# Patient Record
Sex: MALE | Race: OTHER | NOT HISPANIC OR LATINO
[De-identification: names, ages, dates, MRNs, and addresses within clinical notes are randomized per-mention and may not be internally consistent; named-entity substitution may affect disease eponyms.]

---

## 2021-04-05 PROBLEM — Z00.00 ENCOUNTER FOR PREVENTIVE HEALTH EXAMINATION: Status: ACTIVE | Noted: 2021-04-05

## 2021-04-12 ENCOUNTER — APPOINTMENT (OUTPATIENT)
Dept: VASCULAR SURGERY | Facility: CLINIC | Age: 66
End: 2021-04-12
Payer: COMMERCIAL

## 2021-04-12 DIAGNOSIS — Z78.9 OTHER SPECIFIED HEALTH STATUS: ICD-10-CM

## 2021-04-12 DIAGNOSIS — Z86.79 PERSONAL HISTORY OF OTHER DISEASES OF THE CIRCULATORY SYSTEM: ICD-10-CM

## 2021-04-12 PROCEDURE — 99072 ADDL SUPL MATRL&STAF TM PHE: CPT

## 2021-04-12 PROCEDURE — 99243 OFF/OP CNSLTJ NEW/EST LOW 30: CPT

## 2021-04-12 NOTE — ASSESSMENT
[FreeTextEntry1] : 66 yo male with no medical problems with pain in his left calf with ambulation. He has no risk factors for PAD. He has normal PT pulses bilaterally. I am uncertain of the etiology of the pain. I recommended that he undergo exercising ABIs to definitively rule out PAD as the cause of his pain. I recommended that he continue to walk for 1 hour per day. He will follow up with me when the results of the study are available.

## 2021-04-12 NOTE — HISTORY OF PRESENT ILLNESS
[FreeTextEntry1] : 64 yo male reports referred by Dr. Segura for left calf pain with ambulation. He reports that he walks daily with his wife. He reports that he walks for approximately 45 minutes to an hour regularly. He reports mild pain in his left calf with ambulation. He denies a history of smoking. He does not have diabetes. He reports that he occasionally wakes up with pins and needles in his left foot.

## 2021-04-12 NOTE — PHYSICAL EXAM
[JVD] : no jugular venous distention  [Carotid Bruits] : no carotid bruits [Normal Breath Sounds] : Normal breath sounds [Normal Rate and Rhythm] : normal rate and rhythm [2+] : left 2+ [0] : right 0 [Ankle Swelling (On Exam)] : not present [Ankle Swelling Bilaterally] : bilaterally  [Abdomen Masses] : No abdominal masses [No Rash or Lesion] : No rash or lesion [Alert] : alert [Oriented to Person] : oriented to person [Oriented to Place] : oriented to place [Oriented to Time] : oriented to time [de-identified] : Awake and Alert [de-identified] : bilateral feet warm and pink no ulceration or skin breakdown [de-identified] : No gross motor or sensory deficits [de-identified] : Appropriate

## 2021-04-12 NOTE — CONSULT LETTER
[Dear  ___] : Dear  [unfilled], [Consult Letter:] : I had the pleasure of evaluating your patient, [unfilled]. [Please see my note below.] : Please see my note below. [Consult Closing:] : Thank you very much for allowing me to participate in the care of this patient.  If you have any questions, please do not hesitate to contact me. [Sincerely,] : Sincerely, [FreeTextEntry2] : Bryant Segura [FreeTextEntry3] : Nick Bonilla MD, RPVI\par Chief, Vascular Surgery\par

## 2021-04-13 ENCOUNTER — APPOINTMENT (OUTPATIENT)
Dept: VASCULAR SURGERY | Facility: CLINIC | Age: 66
End: 2021-04-13
Payer: COMMERCIAL

## 2021-04-13 DIAGNOSIS — I70.212 ATHEROSCLEROSIS OF NATIVE ARTERIES OF EXTREMITIES WITH INTERMITTENT CLAUDICATION, LEFT LEG: ICD-10-CM

## 2021-04-13 PROCEDURE — 99072 ADDL SUPL MATRL&STAF TM PHE: CPT

## 2021-04-13 PROCEDURE — 93924 LWR XTR VASC STDY BILAT: CPT

## 2021-04-15 ENCOUNTER — TRANSCRIPTION ENCOUNTER (OUTPATIENT)
Age: 66
End: 2021-04-15

## 2021-04-19 ENCOUNTER — APPOINTMENT (OUTPATIENT)
Dept: VASCULAR SURGERY | Facility: CLINIC | Age: 66
End: 2021-04-19
Payer: COMMERCIAL

## 2021-04-19 PROCEDURE — 99072 ADDL SUPL MATRL&STAF TM PHE: CPT

## 2021-04-19 PROCEDURE — 99213 OFFICE O/P EST LOW 20 MIN: CPT

## 2021-04-19 NOTE — REVIEW OF SYSTEMS
[Fever] : no fever [Chills] : no chills [Leg Claudication] : intermittent leg claudication [Lower Ext Edema] : no extremity edema [Limb Pain] : no limb pain [Limb Swelling] : no limb swelling [Limb Weakness] : no limb weakness [Difficulty Walking] : no difficulty walking

## 2021-04-19 NOTE — ASSESSMENT
[FreeTextEntry1] : 66 yo male with no medical problems with pain in his left calf with ambulation. He has no risk factors for PAD. He has normal PT pulses bilaterally. He underwent exercising ABIs which demonstrated a normal COURTNEY at rest and post exercise. I believe this effectively rules out PAD as the source of his pain. I am suspicious that the pain is musculoskeletal in etiology. I recommended that he make an appointment with an orthopedist for further evaluation.\par \par Follow up on a PRN basis.

## 2021-04-19 NOTE — HISTORY OF PRESENT ILLNESS
[FreeTextEntry1] : 66 yo male reports referred by Dr. Segura for left calf pain with ambulation. He reports that he walks daily with his wife. He reports that he walks for approximately 45 minutes to an hour regularly. He reports mild pain in his left calf with ambulation. He denies a history of smoking. He does not have diabetes. He reports that he occasionally wakes up with pins and needles in his left foot.  [de-identified] : 66 yo male returns in follow up for a chief complaint of pain in his left calf with ambulation. He had normal PT pulses on physical exam and a normal COURTNEY at rest. He underwent exercise arterial testing and is here to discuss the results. He reports continued tenderness in his left calf especially when walking up hills or in the heat. He continues to walk daily with his wife.

## 2021-04-19 NOTE — PHYSICAL EXAM
[JVD] : no jugular venous distention  [Carotid Bruits] : no carotid bruits [Normal Breath Sounds] : Normal breath sounds [Normal Rate and Rhythm] : normal rate and rhythm [2+] : left 2+ [0] : left 0 [Ankle Swelling (On Exam)] : not present [Ankle Swelling Bilaterally] : bilaterally  [Abdomen Masses] : No abdominal masses [No Rash or Lesion] : No rash or lesion [Alert] : alert [Oriented to Person] : oriented to person [Oriented to Place] : oriented to place [Oriented to Time] : oriented to time [de-identified] : Awake and Alert [de-identified] : bilateral feet warm and pink no ulceration or skin breakdown [de-identified] : No gross motor or sensory deficits [de-identified] : Appropriate

## 2021-04-24 PROBLEM — I70.212 ATHEROSCLEROSIS OF NATIVE ARTERY OF LEFT LOWER EXTREMITY WITH INTERMITTENT CLAUDICATION: Status: ACTIVE | Noted: 2021-04-12

## 2021-05-27 ENCOUNTER — RESULT REVIEW (OUTPATIENT)
Age: 66
End: 2021-05-27

## 2021-06-18 ENCOUNTER — NON-APPOINTMENT (OUTPATIENT)
Age: 66
End: 2021-06-18

## 2021-06-18 ENCOUNTER — APPOINTMENT (OUTPATIENT)
Dept: HEART AND VASCULAR | Facility: CLINIC | Age: 66
End: 2021-06-18
Payer: COMMERCIAL

## 2021-06-18 VITALS
SYSTOLIC BLOOD PRESSURE: 130 MMHG | RESPIRATION RATE: 16 BRPM | BODY MASS INDEX: 25.18 KG/M2 | DIASTOLIC BLOOD PRESSURE: 76 MMHG | HEIGHT: 73 IN | TEMPERATURE: 97.8 F | HEART RATE: 65 BPM | OXYGEN SATURATION: 98 % | WEIGHT: 190 LBS

## 2021-06-18 DIAGNOSIS — R93.1 ABNORMAL FINDINGS ON DIAGNOSTIC IMAGING OF HEART AND CORONARY CIRCULATION: ICD-10-CM

## 2021-06-18 PROCEDURE — 99072 ADDL SUPL MATRL&STAF TM PHE: CPT

## 2021-06-18 PROCEDURE — 99204 OFFICE O/P NEW MOD 45 MIN: CPT

## 2021-06-25 PROBLEM — R93.1 ELEVATED CORONARY ARTERY CALCIUM SCORE: Status: ACTIVE | Noted: 2021-06-25

## 2021-07-19 ENCOUNTER — RESULT REVIEW (OUTPATIENT)
Age: 66
End: 2021-07-19

## 2021-07-27 ENCOUNTER — RESULT REVIEW (OUTPATIENT)
Age: 66
End: 2021-07-27

## 2021-08-20 ENCOUNTER — APPOINTMENT (OUTPATIENT)
Dept: HEART AND VASCULAR | Facility: CLINIC | Age: 66
End: 2021-08-20
Payer: COMMERCIAL

## 2021-08-20 PROCEDURE — 99213 OFFICE O/P EST LOW 20 MIN: CPT | Mod: 95

## 2021-08-21 RX ORDER — ASPIRIN 81 MG/1
81 TABLET, CHEWABLE ORAL
Refills: 0 | Status: ACTIVE | COMMUNITY
Start: 2021-08-21

## 2021-08-21 RX ORDER — METOPROLOL TARTRATE 25 MG/1
25 TABLET, FILM COATED ORAL DAILY
Qty: 2 | Refills: 0 | Status: DISCONTINUED | COMMUNITY
Start: 2021-07-13 | End: 2021-08-21

## 2021-08-21 RX ORDER — LOSARTAN POTASSIUM 50 MG/1
50 TABLET, FILM COATED ORAL
Qty: 30 | Refills: 0 | Status: ACTIVE | COMMUNITY
Start: 2021-08-13

## 2021-08-21 RX ORDER — CLOPIDOGREL BISULFATE 75 MG/1
75 TABLET, FILM COATED ORAL
Qty: 30 | Refills: 0 | Status: ACTIVE | COMMUNITY
Start: 2021-08-13

## 2021-08-21 RX ORDER — ATORVASTATIN CALCIUM 40 MG/1
40 TABLET, FILM COATED ORAL
Qty: 30 | Refills: 0 | Status: ACTIVE | COMMUNITY
Start: 2021-08-13

## 2021-08-24 NOTE — DISCUSSION/SUMMARY
[FreeTextEntry1] : 64yo M with recent diagnosis of CAD s/p PCI to LAD.\par \par Patient's dietary, exercise and overall lifestyle habits were reviewed. The concept of atherosclerosis and its systemic nature was discussed with a focus on the need to get all cardiovascular risk factors to goal. At this time, I would like to make the following recommendations to optimize atherosclerotic risk factors. \par \par Secondary prevention recommendations:\par Anti-platelet Therapy: DAPT per interventionalist recommendation. \par Lipid Therapy: High dose statin therapy would likely benefit this patient. LDL goal <70. Would check lipid profile in 3 months an consider adding other agents (such as Zetia) if remains above goal\par Hypertension: Patient recently started on losartan.  \par Mediterranean Diet Score is 5. Patient is already planned to see nutrition to discuss dietary supplements. Some suggestions include continue incorporating 2 or more servings per day of vegetables, fruits, and whole grains. Increase intake of fish and legumes/beans to 2 or more servings per week. Aim to increase intake of healthy fats, such as olive oil and avocados, and have a handful of nuts/seeds most days. Reduce red/processed meat consumption to 2 or fewer times per week. Supplements such as fish oil unlikely to help given unknown composition of supplements and triglycerides already in normal range. \par Exercise: Recommended gradually increasing activity to 30-45 minutes most days of the week once cleared by Dr. cMkeon. Cardiac rehab might benefit this patient and is covered by major insurance plans (other than co-pays), please refer. \par Obesity/Overweight: Patient's last BMI borderline (25)--pt has lost weight since then so likely in normal range now. Continue with portion control but incorporate healthy foods as outlined above. \par Glucometabolic State: HbA1c 5.8%. Discussed prediabetes and importance of dietary habits. \par \par Thank you for the opportunity to see this patient. Please feel free to contact [facility] if there are any questions, or if you feel that your patient would benefit from continued follow-up visits with the Program.

## 2021-08-24 NOTE — REASON FOR VISIT
[CV Risk Factors and Non-Cardiac Disease] : CV risk factors and non-cardiac disease [Spouse] : spouse [Home] : at home, [unfilled] , at the time of the visit. [Medical Office: ([facility])___] : at the medical office located in  [Verbal consent obtained from patient] : the patient, [unfilled] [FreeTextEntry1] : \par

## 2021-08-25 ENCOUNTER — APPOINTMENT (OUTPATIENT)
Dept: HEART AND VASCULAR | Facility: CLINIC | Age: 66
End: 2021-08-25
Payer: COMMERCIAL

## 2021-08-25 PROCEDURE — 99244 OFF/OP CNSLTJ NEW/EST MOD 40: CPT | Mod: GT

## 2021-08-31 NOTE — HISTORY OF PRESENT ILLNESS
COLONOSCOPY REPORT.    GASTROENTEROLOGIST: Raj Thornton D.O    ASSISTANT: None    2021    Simon Tierney  : 1950  MRN: [id number]    PRE-OPERATIVE DIAGNOSES / INDICATIONS:  · Screening for colon cancer    COLONOSCOPY DONE 2021, REVEALED:  · Normal colon    OPERATION PERFORMED:  · Colonoscopy up to the terminal ileum with the help of anesthesia service.      EBL:  None.    ANESTHESIA:  MAC    SPECIMENS:   None    IMPLANTS:  None    COMPLICATIONS: None    DESCRIPTION OF OPERATION:  The risks and benefits of the procedure were explained to the patient who demonstrated an understanding and consented to the procedure.  Informed consent discussion included discussion of risks of sedation and procedure. The history was reviewed and the patient was examined. The patient was stable for the procedure and monitored throughout.     Digital rectal exam revealed normal anal tone.  No mass lesions were felt on rectal exam.    The endoscope was then introduced into the rectum and advanced carefully to the cecum.   Appendiceal orifice was visualized.     · The prep was adequate.  · Terminal ileum was intubated. Mucosa in distal 10-15 cm of terminal ileum was normal.  · Mucosa in the cecum, ascending, transverse, descending, sigmoid colon and rectum was normal.    At the anal verge on retroflexion minimal internal hemorrhoids were noticed.  No mass lesions or fissures were noticed in the anal canal.    Following the procedure, the colon was decompressed, the endoscope removed and the procedure was terminated. Immediate postoperative condition of patient was normal and the patient was transferred to the recovery area.     RECOMMENDATIONS:  · Follow up with primary care as scheduled/as needed.  · Call my office with questions or concerns at [phone].   · Patient to increase oral fluid intake while taking fiber.  · Repeat colonoscopy in 10 years.      [FreeTextEntry1] : 64yo M with:\par CAD: recent diagnosis, s/p LAD PCI following abnl calcium score and finding of puente and mild sscp\par started on aspirin and clopidogrel\par s/p PCI last Friday\par \par Seen for preventive assessment. \par After visit with cardiology, lost weight intentionally with significant dietary changes. \par Feeling well post procedure\par Has an appointment with nutritionist\par Tolerating new med regimen \par Started on losartan as well for HTN\par \par Recent labs:\par Cr 1.26\par Chol 223\par Trig 61\par HDL 65\par \par \par HgbA1c a month ago 5.8\par \par Current Meds:\par asa 81\par atorvastatin 40mg\par losartan 50mg\par clopidogrel 75mg\par supplements: vitamins, fish oil\par \par Lifestyle History:\par Mediterranean Diet Score (9 question survey) was 5.\par (8-9: optimal, 6-7: near-optimal, 4-5: suboptimal, 0-3: markedly suboptimal)\par As above, made significant diet changes in last month--stopped eating pizza, pasta, bread\par Exercise:Walks for 40-50 minutes.\par Smoking: Never smoker\par \par FH:\par Mother: started having heart disease in her 60s\par kids 36 and 33 - both healthy

## 2022-07-08 ENCOUNTER — NON-APPOINTMENT (OUTPATIENT)
Age: 67
End: 2022-07-08

## 2022-07-08 ENCOUNTER — APPOINTMENT (OUTPATIENT)
Dept: HEART AND VASCULAR | Facility: CLINIC | Age: 67
End: 2022-07-08

## 2022-07-08 VITALS
SYSTOLIC BLOOD PRESSURE: 118 MMHG | HEIGHT: 73 IN | DIASTOLIC BLOOD PRESSURE: 78 MMHG | TEMPERATURE: 98 F | OXYGEN SATURATION: 97 % | BODY MASS INDEX: 22.4 KG/M2 | HEART RATE: 59 BPM | WEIGHT: 169 LBS

## 2022-07-08 DIAGNOSIS — I25.10 ATHEROSCLEROTIC HEART DISEASE OF NATIVE CORONARY ARTERY W/OUT ANGINA PECTORIS: ICD-10-CM

## 2022-07-08 DIAGNOSIS — Z95.5 PRESENCE OF CORONARY ANGIOPLASTY IMPLANT AND GRAFT: ICD-10-CM

## 2022-07-08 PROCEDURE — 99214 OFFICE O/P EST MOD 30 MIN: CPT

## 2022-09-06 ENCOUNTER — RESULT REVIEW (OUTPATIENT)
Age: 67
End: 2022-09-06

## 2022-09-15 ENCOUNTER — NON-APPOINTMENT (OUTPATIENT)
Age: 67
End: 2022-09-15

## 2023-11-10 ENCOUNTER — NON-APPOINTMENT (OUTPATIENT)
Age: 68
End: 2023-11-10

## 2023-11-10 ENCOUNTER — APPOINTMENT (OUTPATIENT)
Dept: HEART AND VASCULAR | Facility: CLINIC | Age: 68
End: 2023-11-10
Payer: MEDICARE

## 2023-11-10 VITALS
BODY MASS INDEX: 24.59 KG/M2 | DIASTOLIC BLOOD PRESSURE: 78 MMHG | WEIGHT: 185.56 LBS | HEIGHT: 73 IN | SYSTOLIC BLOOD PRESSURE: 146 MMHG | TEMPERATURE: 97.8 F | OXYGEN SATURATION: 96 % | HEART RATE: 100 BPM | RESPIRATION RATE: 18 BRPM

## 2023-11-10 PROCEDURE — 99214 OFFICE O/P EST MOD 30 MIN: CPT

## 2024-07-15 ENCOUNTER — NON-APPOINTMENT (OUTPATIENT)
Age: 69
End: 2024-07-15

## 2024-07-16 ENCOUNTER — APPOINTMENT (OUTPATIENT)
Dept: HEART AND VASCULAR | Facility: CLINIC | Age: 69
End: 2024-07-16
Payer: MEDICARE

## 2024-07-16 VITALS
RESPIRATION RATE: 17 BRPM | HEIGHT: 73 IN | BODY MASS INDEX: 24.39 KG/M2 | DIASTOLIC BLOOD PRESSURE: 70 MMHG | SYSTOLIC BLOOD PRESSURE: 126 MMHG | WEIGHT: 184 LBS | HEART RATE: 64 BPM

## 2024-07-16 PROCEDURE — 99214 OFFICE O/P EST MOD 30 MIN: CPT

## 2024-07-16 PROCEDURE — G2211 COMPLEX E/M VISIT ADD ON: CPT

## 2024-07-16 PROCEDURE — 99204 OFFICE O/P NEW MOD 45 MIN: CPT

## 2024-07-17 ENCOUNTER — APPOINTMENT (OUTPATIENT)
Dept: HEART AND VASCULAR | Facility: CLINIC | Age: 69
End: 2024-07-17
Payer: MEDICARE

## 2024-07-17 VITALS
WEIGHT: 185 LBS | BODY MASS INDEX: 24.52 KG/M2 | SYSTOLIC BLOOD PRESSURE: 140 MMHG | OXYGEN SATURATION: 96 % | HEART RATE: 59 BPM | HEIGHT: 73 IN | DIASTOLIC BLOOD PRESSURE: 92 MMHG

## 2024-07-17 PROCEDURE — 93320 DOPPLER ECHO COMPLETE: CPT

## 2024-07-17 PROCEDURE — 93351 STRESS TTE COMPLETE: CPT

## 2024-07-17 PROCEDURE — 93325 DOPPLER ECHO COLOR FLOW MAPG: CPT

## 2024-07-19 ENCOUNTER — APPOINTMENT (OUTPATIENT)
Dept: HEART AND VASCULAR | Facility: CLINIC | Age: 69
End: 2024-07-19
Payer: MEDICARE

## 2024-07-19 PROCEDURE — 93922 UPR/L XTREMITY ART 2 LEVELS: CPT

## 2024-08-20 ENCOUNTER — APPOINTMENT (OUTPATIENT)
Dept: HEART AND VASCULAR | Facility: CLINIC | Age: 69
End: 2024-08-20
Payer: MEDICARE

## 2024-08-20 VITALS
BODY MASS INDEX: 24.25 KG/M2 | RESPIRATION RATE: 15 BRPM | HEIGHT: 73 IN | SYSTOLIC BLOOD PRESSURE: 126 MMHG | OXYGEN SATURATION: 98 % | HEART RATE: 58 BPM | WEIGHT: 183 LBS | TEMPERATURE: 97.6 F | DIASTOLIC BLOOD PRESSURE: 78 MMHG

## 2024-08-20 PROCEDURE — 99214 OFFICE O/P EST MOD 30 MIN: CPT

## 2024-08-20 PROCEDURE — G2211 COMPLEX E/M VISIT ADD ON: CPT

## 2024-08-20 NOTE — REASON FOR VISIT
[CV Risk Factors and Non-Cardiac Disease] : CV risk factors and non-cardiac disease [FreeTextEntry1] : 68-year-old male with a past medical history significant for hypertension, hyperlipidemia known history of coronary artery disease prior drug-eluting stent placement in the proximal LAD in 2021 presents for follow-up of his stress echo as well as ankle-brachial indexes  The patient continues to feel very well exercising daily without any chest pain, his bilateral big toe pain continues but his ankle-brachial indexes were normal and he has good distal pulses

## 2024-08-29 ENCOUNTER — APPOINTMENT (OUTPATIENT)
Dept: ORTHOPEDIC SURGERY | Facility: CLINIC | Age: 69
End: 2024-08-29
Payer: MEDICARE

## 2024-08-29 VITALS
HEIGHT: 73 IN | DIASTOLIC BLOOD PRESSURE: 88 MMHG | HEART RATE: 62 BPM | BODY MASS INDEX: 24.12 KG/M2 | SYSTOLIC BLOOD PRESSURE: 137 MMHG | OXYGEN SATURATION: 94 % | WEIGHT: 182 LBS | RESPIRATION RATE: 17 BRPM | TEMPERATURE: 97 F

## 2024-08-29 DIAGNOSIS — M54.50 LOW BACK PAIN, UNSPECIFIED: ICD-10-CM

## 2024-08-29 PROCEDURE — 99203 OFFICE O/P NEW LOW 30 MIN: CPT

## 2024-08-29 PROCEDURE — 72110 X-RAY EXAM L-2 SPINE 4/>VWS: CPT

## 2024-08-29 NOTE — ASSESSMENT
[FreeTextEntry1] : Mr. Rodriguez has lumbar spondylosis and lumbar degenerative disc disease with acute on chronic low back pain.  We do lengthy talk today in the office.  I think he should slowly continue to get back into his regular level of exercise.  He needs to continue with the stretching.  He should continue with the walking.  I am going to place him into a formalized physical therapy program and a referral was given today.  He could still work with his chiropractor if needed.  I think it is acceptable for him to use the inversion table.  I would anticipate this to continue to improve over time.  If he has additional issues down the line we may consider pain management for injection therapy.  I will reassess him as needed.

## 2024-08-29 NOTE — CONSULT LETTER
[Dear  ___] : Dear  [unfilled], [Consult Letter:] : I had the pleasure of evaluating your patient, [unfilled]. [Please see my note below.] : Please see my note below. [Consult Closing:] : Thank you very much for allowing me to participate in the care of this patient.  If you have any questions, please do not hesitate to contact me. [Sincerely,] : Sincerely, [FreeTextEntry3] : Jo Landry MD  of Spine Surgery, [facility]

## 2024-08-29 NOTE — PHYSICAL EXAM
[de-identified] : On exam he is a healthy-appearing male in no acute distress.  He rises without difficulty and ambulates with a nonantalgic gait.  No single area of point tenderness to palpation.  No palpable muscle spasms.  Range of motion is within normal limits.  Heel and toe rise is intact.  Seated position straight leg raising is negative.  Hip range of motion is painless.  Light touch sensations intact.  Motor strength 5 out of 5.  Reflexes normal and symmetrical.  RAFY testing negative [de-identified] : 4 views of the lumbar spine including flexion-extension demonstrates an overall normal alignment with mild loss of lordosis.  He has got multilevel facet arthropathy particularly L3-4 L4-5 and L5-S1.  There are some anterior osteophytes.  There are some disc degeneration L3-4 and L4-5.  There are no obvious osseous lesions.

## 2024-08-29 NOTE — HISTORY OF PRESENT ILLNESS
[de-identified] : 68-year-old male with a chief complaint of lower back pain.  He has had some issues on and off for several years.  This most recent episode started about 2 weeks ago.  He is an avid golfer.  He was playing golf, he got to about the 12th hole when he had a sudden onset of pain in the mid back low back that would really occur with his golf swing.  He ended up giving up golf for the day he has not played in 2 weeks.  Overall he is feeling better since he is taken some rest.  In the past has worked with a chiropractor.  He saw his chiropractor once or twice over the past 2 weeks which has helped him.  His symptoms are back pain nonradiating.  No numbness or tingling.  No weakness.  No recent changes in his bowel or bladder habits.  No recent injuries or illnesses.  He takes Tylenol over-the-counter on an occasional basis for [Improving] : improving

## 2025-05-01 ENCOUNTER — NON-APPOINTMENT (OUTPATIENT)
Age: 70
End: 2025-05-01

## 2025-05-06 ENCOUNTER — APPOINTMENT (OUTPATIENT)
Dept: HEART AND VASCULAR | Facility: CLINIC | Age: 70
End: 2025-05-06
Payer: MEDICARE

## 2025-05-06 VITALS
HEART RATE: 51 BPM | TEMPERATURE: 97 F | OXYGEN SATURATION: 98 % | SYSTOLIC BLOOD PRESSURE: 134 MMHG | HEIGHT: 73 IN | RESPIRATION RATE: 16 BRPM | WEIGHT: 178 LBS | DIASTOLIC BLOOD PRESSURE: 64 MMHG | BODY MASS INDEX: 23.59 KG/M2

## 2025-05-06 PROCEDURE — 99214 OFFICE O/P EST MOD 30 MIN: CPT

## 2025-05-09 NOTE — REVIEW OF SYSTEMS
Sutured the left ventricular lead. [Fever] : no fever [Chills] : no chills [Leg Claudication] : intermittent leg claudication [Lower Ext Edema] : no extremity edema [Limb Pain] : no limb pain [Limb Swelling] : no limb swelling [Limb Weakness] : no limb weakness [Difficulty Walking] : no difficulty walking